# Patient Record
Sex: MALE
[De-identification: names, ages, dates, MRNs, and addresses within clinical notes are randomized per-mention and may not be internally consistent; named-entity substitution may affect disease eponyms.]

---

## 2018-05-30 ENCOUNTER — HOSPITAL ENCOUNTER (EMERGENCY)
Dept: HOSPITAL 31 - C.ER | Age: 38
Discharge: HOME | End: 2018-05-30
Payer: COMMERCIAL

## 2018-05-30 VITALS
DIASTOLIC BLOOD PRESSURE: 84 MMHG | TEMPERATURE: 97.9 F | HEART RATE: 80 BPM | OXYGEN SATURATION: 100 % | SYSTOLIC BLOOD PRESSURE: 126 MMHG | RESPIRATION RATE: 16 BRPM

## 2018-05-30 DIAGNOSIS — J34.0: Primary | ICD-10-CM

## 2018-05-30 NOTE — C.PDOC
History Of Present Illness


39y/o male presents to the ED complaining of redness and pain to left side of 

nose for 5 days. States he has a pimple on the inside. The area is continuing 

to bother him. No known mechanism of injury. Patient denies any fever, chills, 

or difficulty breathing.





Time Seen by Provider: 05/30/18 11:13


Chief Complaint (Nursing): ENT Problem


History Per: Patient


History/Exam Limitations: None


Onset/Duration Of Symptoms: Days


Current Symptoms Are (Timing): Still Present





Past Medical History


Reviewed: Historical Data, Nursing Documentation, Vital Signs


Vital Signs: 


 Last Vital Signs











Temp  97.9 F   05/30/18 10:25


 


Pulse  80   05/30/18 10:25


 


Resp  16   05/30/18 10:25


 


BP  126/84   05/30/18 10:25


 


Pulse Ox  100   05/30/18 11:24














- Medical History


PMH: No Chronic Diseases


Surgical History: No Surg Hx


Family History: States: No Known Family Hx





- Social History


Hx Tobacco Use: No


Hx Alcohol Use: No


Hx Substance Use: No





- Immunization History


Hx Tetanus Toxoid Vaccination: No


Hx Influenza Vaccination: No





Review Of Systems


Except As Marked, All Systems Reviewed And Found Negative.


ENT: Positive for: Nose Pain





Physical Exam





- Physical Exam


Appears: Well, Non-toxic, No Acute Distress


Skin: Warm, Dry


Head: Atraumatic, Normacephalic


Eye(s): bilateral: Normal Inspection, PERRL, EOMI


Nose: Other (erythema with pimple present inside the left nostril, at lateral 

aspect; No lymphangitis, no dental involvement)


Oral Mucosa: Moist


Teeth: Normal Dentition


Neck: Normal ROM, Supple


Chest: Symmetrical


Respiratory: No Accessory Muscle Use


Neurological/Psych: Oriented x3, Normal Speech





ED Course And Treatment


O2 Sat by Pulse Oximetry: 100 (RA)


Pulse Ox Interpretation: Normal





Medical Decision Making


Medical Decision Making: 


Clinical Impression: Cellulitis





Plan:


Will d/c home with Bactrim. Patient advised to follow up with PMD in 2 days.








Disposition


Counseled Patient/Family Regarding: Studies Performed, Diagnosis, Need For 

Followup, Rx Given





- Disposition


Referrals: 


[facility] [Outside]


Disposition: HOME/ ROUTINE


Disposition Time: 11:23


Condition: STABLE


Additional Instructions: 


follow up with your doctor in 2 days


call to make an appointment


take medications as prescribed


return to ER if symptoms worsens or progress 


Prescriptions: 


Sulfamethoxazole/Trimethoprim [Bactrim  mg-160 mg] 1 tab PO BID #20 tab


Instructions:  Cellulitis (Skin Infection), Adult (DC)


Forms:  [facility] (English), General Discharge Instructions





- Clinical Impression


Clinical Impression: 


 Cellulitis








- Scribe Statement


The provider has reviewed the documentation as recorded by the Scribe (Mila Finch)


Provider Attestation: 





All medical record entries made by the Scribe were at my direction and 

personally dictated by me. I have reviewed the chart and agree that the record 

accurately reflects my personal performance of the history, physical exam, 

medical decision making, and the department course for this patient. I have 

also personally directed, reviewed, and agree with the discharge instructions 

and disposition.

## 2018-10-13 ENCOUNTER — HOSPITAL ENCOUNTER (EMERGENCY)
Dept: HOSPITAL 31 - C.ER | Age: 38
Discharge: HOME | End: 2018-10-13
Payer: COMMERCIAL

## 2018-10-13 VITALS
SYSTOLIC BLOOD PRESSURE: 131 MMHG | TEMPERATURE: 98.5 F | RESPIRATION RATE: 18 BRPM | DIASTOLIC BLOOD PRESSURE: 81 MMHG | HEART RATE: 80 BPM

## 2018-10-13 VITALS — BODY MASS INDEX: 28.3 KG/M2

## 2018-10-13 VITALS — OXYGEN SATURATION: 100 %

## 2018-10-13 DIAGNOSIS — L02.11: Primary | ICD-10-CM

## 2018-10-13 NOTE — C.PDOC
History Of Present Illness


38 year old male presents to the ED complaining of abscess to posterior neck for

1-2 weeks. Denies any fever, chills, or any other complaints. Reports he is 

concerned about an infection in the area. 


Time Seen by Provider: 10/13/18 16:40


Chief Complaint (Nursing): Abnormal Skin Integrity


History Per: Patient


History/Exam Limitations: no limitations


Onset/Duration Of Symptoms: Days


Current Symptoms Are (Timing): Still Present


Location Of Injury: Posterior: Neck (abscess)


Quality Of Symptoms: Painful





Past Medical History


Reviewed: Historical Data, Nursing Documentation, Vital Signs


Vital Signs: 





                                Last Vital Signs











Temp  97.7 F   10/13/18 16:20


 


Pulse  89   10/13/18 16:20


 


Resp  16   10/13/18 16:20


 


BP  137/91 H  10/13/18 16:20


 


Pulse Ox  100   10/13/18 16:20














- Medical History


PMH: No Chronic Diseases


Surgical History: No Surg Hx


Family History: States: No Known Family Hx





- Social History


Hx Tobacco Use: No


Hx Alcohol Use: No


Hx Substance Use: No





- Immunization History


Hx Tetanus Toxoid Vaccination: No


Hx Influenza Vaccination: No





Review Of Systems


Except As Marked, All Systems Reviewed And Found Negative.


Constitutional: Negative for: Fever, Chills


Skin: Positive for: Other (abscess on posterior neck )





Physical Exam





- Physical Exam


Appears: Non-toxic


Skin: Warm, Dry


Head: Normacephalic


Eye(s): bilateral: Normal Inspection, PERRL, EOMI


Oral Mucosa: Moist


Throat: No Erythema, No Exudate


Neck: Normal ROM, Supple, Other (2cm area of slight erythema, induration and 

tendernes to posterior neck )


Lymphatic: Normal Exam


Chest: Symmetrical


Extremity: Normal ROM


Neurological/Psych: Oriented x3, Normal Speech


Gait: Steady





ED Course And Treatment


O2 Sat by Pulse Oximetry: 100 (RA)


Pulse Ox Interpretation: Normal





Medical Decision Making


Medical Decision Making: 


Orders: 


- Keflex 500mg PO


- Bactrim 1tab PO














Disposition





- Disposition


Referrals: 


[facility] [Outside]


Disposition: HOME/ ROUTINE


Disposition Time: 17:21


Condition: STABLE


Additional Instructions: 


Apply warm compresses to the area 4-5 times per day. Return to the ED in 2 days 

for possible I&D. 


Prescriptions: 


Cephalexin [Keflex] 500 mg PO BID #14 capsule


Sulfamethoxazole/Trimethoprim [Bactrim  mg-160 mg] 1 tab PO BID #14 tab


Instructions:  Skin Abscess


Forms:  [employer] (English)





- Clinical Impression


Clinical Impression: 


 Abscess








- PA / NP / Resident Statement


MD/DO has reviewed & agrees with the documentation as recorded.





- Scribe Statement


The provider has reviewed the documentation as recorded by the Scribe


Mahnaz Bella








All medical record entries made by the [employer] were at my direction and 

personally dictated by me. I have reviewed the chart and agree that the record 

accurately reflects my personal performance of the history, physical exam, 

medical decision making, and the department course for this patient. I have also

personally directed, reviewed, and agree with the discharge instructions and 

disposition.